# Patient Record
Sex: MALE | Race: WHITE
[De-identification: names, ages, dates, MRNs, and addresses within clinical notes are randomized per-mention and may not be internally consistent; named-entity substitution may affect disease eponyms.]

---

## 2018-05-21 ENCOUNTER — HOSPITAL ENCOUNTER (OUTPATIENT)
Dept: HOSPITAL 17 - NEPE | Age: 83
Setting detail: OBSERVATION
LOS: 1 days | Discharge: HOME | End: 2018-05-22
Attending: HOSPITALIST | Admitting: HOSPITALIST
Payer: OTHER GOVERNMENT

## 2018-05-21 ENCOUNTER — HOSPITAL ENCOUNTER (EMERGENCY)
Dept: HOSPITAL 17 - PHED | Age: 83
Discharge: LEFT BEFORE DISCHARGE | End: 2018-05-21
Payer: OTHER GOVERNMENT

## 2018-05-21 VITALS
RESPIRATION RATE: 20 BRPM | DIASTOLIC BLOOD PRESSURE: 64 MMHG | SYSTOLIC BLOOD PRESSURE: 123 MMHG | HEART RATE: 75 BPM | OXYGEN SATURATION: 97 % | TEMPERATURE: 98.2 F

## 2018-05-21 VITALS
SYSTOLIC BLOOD PRESSURE: 127 MMHG | TEMPERATURE: 98.7 F | RESPIRATION RATE: 16 BRPM | HEART RATE: 84 BPM | DIASTOLIC BLOOD PRESSURE: 72 MMHG | OXYGEN SATURATION: 96 %

## 2018-05-21 VITALS
OXYGEN SATURATION: 96 % | HEART RATE: 70 BPM | SYSTOLIC BLOOD PRESSURE: 108 MMHG | RESPIRATION RATE: 16 BRPM | TEMPERATURE: 97.4 F | DIASTOLIC BLOOD PRESSURE: 63 MMHG

## 2018-05-21 VITALS
DIASTOLIC BLOOD PRESSURE: 78 MMHG | OXYGEN SATURATION: 99 % | RESPIRATION RATE: 16 BRPM | HEART RATE: 89 BPM | TEMPERATURE: 97.7 F | SYSTOLIC BLOOD PRESSURE: 115 MMHG

## 2018-05-21 VITALS
RESPIRATION RATE: 18 BRPM | DIASTOLIC BLOOD PRESSURE: 77 MMHG | TEMPERATURE: 98.7 F | OXYGEN SATURATION: 98 % | HEART RATE: 77 BPM | SYSTOLIC BLOOD PRESSURE: 128 MMHG

## 2018-05-21 VITALS — BODY MASS INDEX: 24.24 KG/M2 | WEIGHT: 145.51 LBS | HEIGHT: 65 IN

## 2018-05-21 VITALS — DIASTOLIC BLOOD PRESSURE: 57 MMHG | SYSTOLIC BLOOD PRESSURE: 118 MMHG | RESPIRATION RATE: 16 BRPM

## 2018-05-21 VITALS — BODY MASS INDEX: 25.31 KG/M2 | HEIGHT: 65 IN | WEIGHT: 151.9 LBS

## 2018-05-21 VITALS — RESPIRATION RATE: 17 BRPM | OXYGEN SATURATION: 98 %

## 2018-05-21 VITALS — SYSTOLIC BLOOD PRESSURE: 116 MMHG | TEMPERATURE: 97.8 F | DIASTOLIC BLOOD PRESSURE: 78 MMHG

## 2018-05-21 DIAGNOSIS — I48.91: ICD-10-CM

## 2018-05-21 DIAGNOSIS — Z87.891: ICD-10-CM

## 2018-05-21 DIAGNOSIS — M88.9: ICD-10-CM

## 2018-05-21 DIAGNOSIS — E78.5: ICD-10-CM

## 2018-05-21 DIAGNOSIS — I44.30: ICD-10-CM

## 2018-05-21 DIAGNOSIS — Z95.0: ICD-10-CM

## 2018-05-21 DIAGNOSIS — I10: ICD-10-CM

## 2018-05-21 DIAGNOSIS — S09.90XA: Primary | ICD-10-CM

## 2018-05-21 DIAGNOSIS — R61: ICD-10-CM

## 2018-05-21 DIAGNOSIS — Z95.3: ICD-10-CM

## 2018-05-21 DIAGNOSIS — R55: ICD-10-CM

## 2018-05-21 DIAGNOSIS — E03.9: ICD-10-CM
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
## 2018-05-21 DIAGNOSIS — Z79.01: ICD-10-CM

## 2018-05-21 DIAGNOSIS — Z53.21: Primary | ICD-10-CM
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
## 2018-05-21 DIAGNOSIS — I42.9: ICD-10-CM

## 2018-05-21 DIAGNOSIS — G47.30: ICD-10-CM

## 2018-05-21 LAB
ALBUMIN SERPL-MCNC: 3.3 GM/DL (ref 3.4–5)
ALP SERPL-CCNC: 87 U/L (ref 45–117)
ALT SERPL-CCNC: 24 U/L (ref 12–78)
AST SERPL-CCNC: 21 U/L (ref 15–37)
BASOPHILS # BLD AUTO: 0 TH/MM3 (ref 0–0.2)
BASOPHILS NFR BLD: 0.4 % (ref 0–2)
BILIRUB SERPL-MCNC: 0.8 MG/DL (ref 0.2–1)
BUN SERPL-MCNC: 19 MG/DL (ref 7–18)
CALCIUM SERPL-MCNC: 8.7 MG/DL (ref 8.5–10.1)
CHLORIDE SERPL-SCNC: 104 MEQ/L (ref 98–107)
CREAT SERPL-MCNC: 0.8 MG/DL (ref 0.6–1.3)
EOSINOPHIL # BLD: 0.1 TH/MM3 (ref 0–0.4)
EOSINOPHIL NFR BLD: 2.1 % (ref 0–4)
ERYTHROCYTE [DISTWIDTH] IN BLOOD BY AUTOMATED COUNT: 14.6 % (ref 11.6–17.2)
GFR SERPLBLD BASED ON 1.73 SQ M-ARVRAT: 92 ML/MIN (ref 89–?)
GLUCOSE SERPL-MCNC: 87 MG/DL (ref 74–106)
HCO3 BLD-SCNC: 26.9 MEQ/L (ref 21–32)
HCT VFR BLD CALC: 39 % (ref 39–51)
HGB BLD-MCNC: 13.1 GM/DL (ref 13–17)
INR PPP: 2.1 RATIO
LYMPHOCYTES # BLD AUTO: 0.7 TH/MM3 (ref 1–4.8)
LYMPHOCYTES NFR BLD AUTO: 12.5 % (ref 9–44)
MAGNESIUM SERPL-MCNC: 2.1 MG/DL (ref 1.5–2.5)
MCH RBC QN AUTO: 32.3 PG (ref 27–34)
MCHC RBC AUTO-ENTMCNC: 33.5 % (ref 32–36)
MCV RBC AUTO: 96.5 FL (ref 80–100)
MONOCYTE #: 0.6 TH/MM3 (ref 0–0.9)
MONOCYTES NFR BLD: 11 % (ref 0–8)
NEUTROPHILS # BLD AUTO: 4.3 TH/MM3 (ref 1.8–7.7)
NEUTROPHILS NFR BLD AUTO: 74 % (ref 16–70)
PLATELET # BLD: 163 TH/MM3 (ref 150–450)
PMV BLD AUTO: 8.4 FL (ref 7–11)
PROT SERPL-MCNC: 6.9 GM/DL (ref 6.4–8.2)
PROTHROMBIN TIME: 21.6 SEC (ref 9.8–11.6)
RBC # BLD AUTO: 4.05 MIL/MM3 (ref 4.5–5.9)
SODIUM SERPL-SCNC: 140 MEQ/L (ref 136–145)
TROPONIN I SERPL-MCNC: (no result) NG/ML (ref 0.02–0.05)
TROPONIN I SERPL-MCNC: (no result) NG/ML (ref 0.02–0.05)
WBC # BLD AUTO: 5.8 TH/MM3 (ref 4–11)

## 2018-05-21 PROCEDURE — 97162 PT EVAL MOD COMPLEX 30 MIN: CPT

## 2018-05-21 PROCEDURE — 97166 OT EVAL MOD COMPLEX 45 MIN: CPT

## 2018-05-21 PROCEDURE — 82550 ASSAY OF CK (CPK): CPT

## 2018-05-21 PROCEDURE — 85730 THROMBOPLASTIN TIME PARTIAL: CPT

## 2018-05-21 PROCEDURE — 85610 PROTHROMBIN TIME: CPT

## 2018-05-21 PROCEDURE — 84484 ASSAY OF TROPONIN QUANT: CPT

## 2018-05-21 PROCEDURE — 99285 EMERGENCY DEPT VISIT HI MDM: CPT

## 2018-05-21 PROCEDURE — 73080 X-RAY EXAM OF ELBOW: CPT

## 2018-05-21 PROCEDURE — 83690 ASSAY OF LIPASE: CPT

## 2018-05-21 PROCEDURE — 84443 ASSAY THYROID STIM HORMONE: CPT

## 2018-05-21 PROCEDURE — 93880 EXTRACRANIAL BILAT STUDY: CPT

## 2018-05-21 PROCEDURE — 71045 X-RAY EXAM CHEST 1 VIEW: CPT

## 2018-05-21 PROCEDURE — 85025 COMPLETE CBC W/AUTO DIFF WBC: CPT

## 2018-05-21 PROCEDURE — G0378 HOSPITAL OBSERVATION PER HR: HCPCS

## 2018-05-21 PROCEDURE — 93005 ELECTROCARDIOGRAM TRACING: CPT

## 2018-05-21 PROCEDURE — 99281 EMR DPT VST MAYX REQ PHY/QHP: CPT

## 2018-05-21 PROCEDURE — 70450 CT HEAD/BRAIN W/O DYE: CPT

## 2018-05-21 PROCEDURE — 80053 COMPREHEN METABOLIC PANEL: CPT

## 2018-05-21 PROCEDURE — 83735 ASSAY OF MAGNESIUM: CPT

## 2018-05-21 NOTE — PD
Data


Data


Last Documented VS





Vital Signs








  Date Time  Temp Pulse Resp B/P (MAP) Pulse Ox O2 Delivery O2 Flow Rate FiO2


 


5/21/18 15:08  78 16 118/57 (77)    





  77 16 125/62 (83)    





  71 16 118/64 (82)    


 


5/21/18 15:07     98 Room Air  


 


5/21/18 13:50 98.2       








Orders





 Orders


Electrocardiogram (5/21/18 )


Blood Glucose (5/21/18 13:58)


Ct Brain W/O Iv Contrast(Rout) (5/21/18 )


Complete Blood Count With Diff (5/21/18 14:31)


Comprehensive Metabolic Panel (5/21/18 14:31)


Ckmb (Isoenzyme) Profile (5/21/18 14:31)


Troponin I (5/21/18 14:31)


Prothrombin Time / Inr (Pt) (5/21/18 14:31)


Act Partial Throm Time (Ptt) (5/21/18 14:31)


Lipase (5/21/18 14:31)


Magnesium (Mg) (5/21/18 14:31)


Thyroid Stimulating Hormone (5/21/18 14:31)


Chest, Single Ap (5/21/18 14:31)


Iv Access Insert/Monitor (5/21/18 14:31)


Ecg Monitoring (5/21/18 14:31)


Oximetry (5/21/18 14:31)


Orthostatic Vital Signs (5/21/18 14:31)


Elbow, Complete (4 Vws) (5/21/18 )


Admit Order (Ed Use Only) (5/21/18 16:57)





Labs





Laboratory Tests








Test


  5/21/18


14:45


 


White Blood Count 5.8 TH/MM3 


 


Red Blood Count 4.05 MIL/MM3 


 


Hemoglobin 13.1 GM/DL 


 


Hematocrit 39.0 % 


 


Mean Corpuscular Volume 96.5 FL 


 


Mean Corpuscular Hemoglobin 32.3 PG 


 


Mean Corpuscular Hemoglobin


Concent 33.5 % 


 


 


Red Cell Distribution Width 14.6 % 


 


Platelet Count 163 TH/MM3 


 


Mean Platelet Volume 8.4 FL 


 


Neutrophils (%) (Auto) 74.0 % 


 


Lymphocytes (%) (Auto) 12.5 % 


 


Monocytes (%) (Auto) 11.0 % 


 


Eosinophils (%) (Auto) 2.1 % 


 


Basophils (%) (Auto) 0.4 % 


 


Neutrophils # (Auto) 4.3 TH/MM3 


 


Lymphocytes # (Auto) 0.7 TH/MM3 


 


Monocytes # (Auto) 0.6 TH/MM3 


 


Eosinophils # (Auto) 0.1 TH/MM3 


 


Basophils # (Auto) 0.0 TH/MM3 


 


CBC Comment DIFF FINAL 


 


Differential Comment  


 


Prothrombin Time 21.6 SEC 


 


Prothromb Time International


Ratio 2.1 RATIO 


 


 


Activated Partial


Thromboplast Time 35.7 SEC 


 


 


Blood Urea Nitrogen 19 MG/DL 


 


Creatinine 0.80 MG/DL 


 


Random Glucose 87 MG/DL 


 


Total Protein 6.9 GM/DL 


 


Albumin 3.3 GM/DL 


 


Calcium Level 8.7 MG/DL 


 


Magnesium Level 2.1 MG/DL 


 


Alkaline Phosphatase 87 U/L 


 


Aspartate Amino Transf


(AST/SGOT) 21 U/L 


 


 


Alanine Aminotransferase


(ALT/SGPT) 24 U/L 


 


 


Total Bilirubin 0.8 MG/DL 


 


Sodium Level 140 MEQ/L 


 


Potassium Level 4.3 MEQ/L 


 


Chloride Level 104 MEQ/L 


 


Carbon Dioxide Level 26.9 MEQ/L 


 


Anion Gap 9 MEQ/L 


 


Estimat Glomerular Filtration


Rate 92 ML/MIN 


 


 


Total Creatine Kinase 80 U/L 


 


Troponin I


  LESS THAN 0.02


NG/ML


 


Lipase 65 U/L 


 


Thyroid Stimulating Hormone


3rd Gen 1.840 uIU/ML 


 











MDM


Supervised Visit with NORMAN:  Yes


Narrative Course


The history, exam, and medical decision-making in the associated midlevel 

provider note were completed with my assistance. I reviewed and agree with the 

findings presented.  I attest that I had a face-to-face encounter with the 

patient on the same day, and personally performed and documented my assessment 

and findings in the medical record. 





*My assessment and Findings: This is an 85-year-old male who presents to the 

emergency department having had an episode of syncope.  He was placed in a 

monitor and an IV was established.  He does have a history of heart disease.  

His pacemaker was interrogated which is reassuring.  Initial labs are 

reassuring.  Patient will be placed in observation for serial cardiac enzymes 

and syncope evaluation given his age and comorbidities.


Diagnosis





 Primary Impression:  


 Syncope


 Qualified Codes:  R55 - Syncope and collapse











Lucero Rebolledo MD May 21, 2018 19:28

## 2018-05-21 NOTE — RADRPT
EXAM DATE/TIME:  05/21/2018 14:42 

 

[facility] COMPARISON:     

CHEST SINGLE AP, December 11, 2015, 8:52.

 

                     

INDICATIONS :     

Syncope.

                     

 

MEDICAL HISTORY :     

None.          

 

SURGICAL HISTORY :     

Pacemaker. CABG.  

 

ENCOUNTER:     

Initial                                        

 

ACUITY:     

1 day      

 

PAIN SCORE:     

0/10

 

LOCATION:     

Bilateral chest 

 

FINDINGS:     

Pacemaker device is noted with control pack over the left chest. A mild infiltrate in the left lung b
ase with slight blunting of the costophrenic angle. Right lung is grossly clear. Cardiac contours are
 satisfactory contact for technique and projection.

 

CONCLUSION:     

Mild left base infiltrate and effusion

 

 

 

 Sanchez Wu MD on May 21, 2018 at 15:02           

Board Certified Radiologist.

 This report was verified electronically.

## 2018-05-21 NOTE — RADRPT
EXAM DATE/TIME:  05/21/2018 14:47 

 

[facility] COMPARISON:     

No previous studies available for comparison.

 

 

INDICATIONS :     

Fall,hit back of head on table.

                      

 

RADIATION DOSE:     

66.34 CTDIvol (mGy) 

 

 

 

MEDICAL HISTORY :     

Cardiovascular disease. Hypertension. 

 

SURGICAL HISTORY :      

Pacemaker. 

 

ENCOUNTER:      

Initial

 

ACUITY:      

1 day

 

PAIN SCALE:      

0/10

 

LOCATION:        

cranial 

 

TECHNIQUE:     

Multiple contiguous axial images were obtained of the head.  Using automated exposure control and adj
ustment of the mA and/or kV according to patient size, radiation dose was kept as low as reasonably a
chievable to obtain optimal diagnostic quality images.   DICOM format image data is available electro
nically for review and comparison.  

 

FINDINGS:     

 

CEREBRUM:     

Mild, symmetric cortical and central atrophy.  Periventricular areas of diminished attenuation are ch
aracteristic of mild small vessel ischemic demyelination. No evidence of midline shift, mass lesion, 
hemorrhage or acute infarction.  No extra-axial fluid collections are seen.

 

POSTERIOR FOSSA:     

The cerebellum and brainstem are intact.  The 4th ventricle is midline.  The cerebellopontine angle i
s unremarkable.

 

EXTRACRANIAL:     

The visualized portion of the orbits is intact.

 

SKULL:     

The calvaria is intact.  No evidence of skull fracture.

 

CONCLUSION:     

1. Some chronic changes with minimal periventricular small vessel ischemic demyelination and mild cor
tical and central atrophy.

2. Nothing acute.

 

 

 

 Karan Dunn MD on May 21, 2018 at 15:52           

Board Certified Radiologist.

 This report was verified electronically.

## 2018-05-21 NOTE — RADRPT
EXAM DATE/TIME:  05/21/2018 15:39 

 

[facility] COMPARISON:     

No previous studies available for comparison.

 

                     

INDICATIONS :     

Pt fell last night. 

                     

 

MEDICAL HISTORY :     

None.          

 

SURGICAL HISTORY :     

Pacemaker. CABG.  

 

ENCOUNTER:     

Subsequent                                        

 

ACUITY:     

1 day      

 

PAIN SCORE:     

0/10

 

LOCATION:     

Right upper extremity Elbow. 

 

FINDINGS:     

Multiple view examination of the right elbow demonstrates increased density and coarse trabecular thi
ckening in the distal humerus. The elbow joint otherwise appears intact without evidence of fracture,
 dislocation or significant arthropathy.

 

 

CONCLUSION:     

1. No evidence of acute fracture or dislocation.

2. Increased density and trabecular thickening in the distal humerus which merit present Paget's dise
ase.

 

 

 

 

 Chun Penn MD on May 21, 2018 at 16:15           

Board Certified Radiologist.

 This report was verified electronically.

## 2018-05-21 NOTE — PD
HPI


Chief Complaint:  Syncope/Near-Syncope


Time Seen by Provider:  14:19


Travel History


International Travel<30 days:  No


Contact w/Intl Traveler<30days:  No


Traveled to known affect area:  No





History of Present Illness


HPI


85-year-old male that presents to the ED for evaluation of syncope and head 

injury.  Per patient his happened yesterday.  Per patient she had "fainting 

episode "yesterday.  Per patient he has never had this before.  Per patient he 

was walking when he all of a sudden felt like he was going to pass out and he 

was not able to hold something and landed on his right ribs and right elbow on 

the table.  Patient does take Coumadin.  He denies hitting his head or losing 

consciousness.  Denies any chest pain or shortness of breath.  Per patient he 

does have a couple of abrasions to his right ribs as well as to his right 

elbow.  He is able to move them fully.  Denies any neck or back pain.  He has a 

pacemaker and his provider from [place] wanted him to come here to get 

evaluated.  Denies any bowel movement or urinary issues.  No numbness, tingling

, weakness.  Patient denies anything preluding to the syncope.  Denies any 

other symptoms.  She denies feeling her pacemaker going off.





PFSH


Past Medical History


Hx Anticoagulant Therapy:  Yes


Heart Rhythm Problems:  Yes (afib )


Cancer:  No


Cardiac Catheterization:  Yes


Cardiovascular Problems:  Yes (DEFB/PACER)


Endocrine:  No


Genitourinary:  No


Hypertension:  Yes


Musculoskeletal:  No


Neurologic:  No


Psychiatric:  No


Reproductive:  No


Respiratory:  No


Sleep Apnea:  Yes (uses CPAP )





Past Surgical History


Pacemaker:  Yes


Valve Replacement:  Yes (aortic valve replacement )





Social History


Alcohol Use:  No


Tobacco Use:  No


Substance Use:  No





Allergies-Medications


(Allergen,Severity, Reaction):  


Coded Allergies:  


     No Known Allergies (Verified  Adverse Reaction, Unknown, 5/21/18)


Reported Meds & Prescriptions





Reported Meds & Active Scripts


Active


Reported


Folic Acid 0.4 Mg Tab 400 Mcg PO DAILY


Metoprolol Succinate ER 24 HR (Metoprolol Succinate) 25 Mg Tab 12.5 Mg PO DAILY


Zocor (Simvastatin) 20 Mg Tab 20 Mg PO HS


Thera-D 2000 (Cholecalciferol) 2,000 Unit Tab 2,000 Units PO DAILY


Warfarin 2.5 Mg Tab 2.5 Mg PO EVERY OTHER DAY


Warfarin 5 Mg Tab 5 Mg PO EVERY OTHER DAY








Review of Systems


Except as stated in HPI:  all other systems reviewed are Neg





Physical Exam


Narrative


GENERAL: 


SKIN: Warm and dry.  Patient has abrasions to the right rib cage as well as to 

the right elbow.


HEAD: Atraumatic. Normocephalic. 


EYES: Pupils equal and round. No scleral icterus. No injection or drainage. 


ENT: No nasal bleeding or discharge.  Mucous membranes pink and moist.  Tongue 

is midline.  No uvula deviation.


NECK: Trachea midline. No JVD. 


CARDIOVASCULAR: Regular rate and rhythm.  No murmurs, S3, S4.


RESPIRATORY: No accessory muscle use. Clear to auscultation. Breath sounds 

equal bilaterally. 


GASTROINTESTINAL: Abdomen soft, non-tender, nondistended. Hepatic and splenic 

margins not palpable. 


MUSCULOSKELETAL: Extremities without clubbing, cyanosis, or edema. No obvious 

deformities.  Full range of motion of the upper and lower extremities 

bilaterally.  2+ pulses bilaterally.


NEUROLOGICAL: Awake and alert. No obvious cranial nerve deficits.  Motor 

grossly within normal limits. Five out of 5 muscle strength in the arms and 

legs.  Normal speech.


PSYCHIATRIC: Appropriate mood and affect; insight and judgment normal.





Data


Data


Last Documented VS





Vital Signs








  Date Time  Temp Pulse Resp B/P (MAP) Pulse Ox O2 Delivery O2 Flow Rate FiO2


 


5/21/18 15:08  78 16 118/57 (77)    





  77 16 125/62 (83)    





  71 16 118/64 (82)    


 


5/21/18 15:07     98 Room Air  


 


5/21/18 13:50 98.2       








Orders





 Orders


Electrocardiogram (5/21/18 )


Blood Glucose (5/21/18 13:58)


Ct Brain W/O Iv Contrast(Rout) (5/21/18 )


Electrocardiogram (5/21/18 14:31)


Complete Blood Count With Diff (5/21/18 14:31)


Comprehensive Metabolic Panel (5/21/18 14:31)


Ckmb (Isoenzyme) Profile (5/21/18 14:31)


Troponin I (5/21/18 14:31)


Prothrombin Time / Inr (Pt) (5/21/18 14:31)


Act Partial Throm Time (Ptt) (5/21/18 14:31)


Lipase (5/21/18 14:31)


Magnesium (Mg) (5/21/18 14:31)


Thyroid Stimulating Hormone (5/21/18 14:31)


Chest, Single Ap (5/21/18 14:31)


Iv Access Insert/Monitor (5/21/18 14:31)


Ecg Monitoring (5/21/18 14:31)


Oximetry (5/21/18 14:31)


Orthostatic Vital Signs (5/21/18 14:31)


Elbow, Complete (4 Vws) (5/21/18 )


Admit Order (Ed Use Only) (5/21/18 16:57)





Labs





Laboratory Tests








Test


  5/21/18


14:45


 


White Blood Count 5.8 TH/MM3 


 


Red Blood Count 4.05 MIL/MM3 


 


Hemoglobin 13.1 GM/DL 


 


Hematocrit 39.0 % 


 


Mean Corpuscular Volume 96.5 FL 


 


Mean Corpuscular Hemoglobin 32.3 PG 


 


Mean Corpuscular Hemoglobin


Concent 33.5 % 


 


 


Red Cell Distribution Width 14.6 % 


 


Platelet Count 163 TH/MM3 


 


Mean Platelet Volume 8.4 FL 


 


Neutrophils (%) (Auto) 74.0 % 


 


Lymphocytes (%) (Auto) 12.5 % 


 


Monocytes (%) (Auto) 11.0 % 


 


Eosinophils (%) (Auto) 2.1 % 


 


Basophils (%) (Auto) 0.4 % 


 


Neutrophils # (Auto) 4.3 TH/MM3 


 


Lymphocytes # (Auto) 0.7 TH/MM3 


 


Monocytes # (Auto) 0.6 TH/MM3 


 


Eosinophils # (Auto) 0.1 TH/MM3 


 


Basophils # (Auto) 0.0 TH/MM3 


 


CBC Comment DIFF FINAL 


 


Differential Comment  


 


Prothrombin Time 21.6 SEC 


 


Prothromb Time International


Ratio 2.1 RATIO 


 


 


Activated Partial


Thromboplast Time 35.7 SEC 


 


 


Blood Urea Nitrogen 19 MG/DL 


 


Creatinine 0.80 MG/DL 


 


Random Glucose 87 MG/DL 


 


Total Protein 6.9 GM/DL 


 


Albumin 3.3 GM/DL 


 


Calcium Level 8.7 MG/DL 


 


Magnesium Level 2.1 MG/DL 


 


Alkaline Phosphatase 87 U/L 


 


Aspartate Amino Transf


(AST/SGOT) 21 U/L 


 


 


Alanine Aminotransferase


(ALT/SGPT) 24 U/L 


 


 


Total Bilirubin 0.8 MG/DL 


 


Sodium Level 140 MEQ/L 


 


Potassium Level 4.3 MEQ/L 


 


Chloride Level 104 MEQ/L 


 


Carbon Dioxide Level 26.9 MEQ/L 


 


Anion Gap 9 MEQ/L 


 


Estimat Glomerular Filtration


Rate 92 ML/MIN 


 


 


Total Creatine Kinase 80 U/L 


 


Troponin I


  LESS THAN 0.02


NG/ML


 


Lipase 65 U/L 


 


Thyroid Stimulating Hormone


3rd Gen 1.840 uIU/ML 


 











MDM


Medical Decision Making


Medical Screen Exam Complete:  Yes


Emergency Medical Condition:  Yes


Medical Record Reviewed:  Yes


Interpretation(s)


CBC & BMP Diagram


5/21/18 14:45








Total Protein 6.9, Albumin 3.3 L, Calcium Level 8.7, Magnesium Level 2.1, 

Alkaline Phosphatase 87, Aspartate Amino Transf (AST/SGOT) 21, Alanine 

Aminotransferase (ALT/SGPT) 24, Total Bilirubin 0.8








Last Impressions








Chest X-Ray 5/21/18 1431 Signed





Impressions: 





 Service Date/Time:  Monday, May 21, 2018 14:42 - CONCLUSION:  Mild left base 





 infiltrate and effusion     Sanchez Wu MD 


 


Head CT 5/21/18 0000 Signed





Impressions: 





 Service Date/Time:  Monday, May 21, 2018 14:47 - CONCLUSION:  1. Some chronic 





 changes with minimal periventricular small vessel ischemic demyelination and 





 mild cortical and central atrophy. 2. Nothing acute.     Karan Dunn MD 


 


Elbow X-Ray 5/21/18 0000 Signed





Impressions: 





 Service Date/Time:  Monday, May 21, 2018 15:39 - CONCLUSION:  1. No evidence 

of 





 acute fracture or dislocation. 2. Increased density and trabecular thickening 

in 





 the distal humerus which merit present Paget's disease.      Chun Penn MD 





EKG show paced rhythm.  PVCs noted.  Read by me and attending.  No sign of ST 

elevation or ischemia.


Troponin and CK-MB negative.


Coags with elevated INR


Differential Diagnosis


Syncope versus presyncope versus cardiac syncope versus arrhythmia versus 

versus fracture versus bruise versus contusion


Narrative Course


85-year-old male that presents to the ED for evaluation of syncope.  Patient 

was properly examined and was found to have signs and symptoms consistent with 

appears to be syncope.  Labs and imaging order.  Labs and imaging showed no 

sign of acute disease.  Patient was reassured.  Unclear etiology of the syncope 

with deafly concerning.  He does have multiple comorbidities for different 

disease.  The recommend admission for further evaluation.  Patient agrees for 

this.  My attending Dr. Rebolledo evaluated the patient agrees with this.  Patient 

was admitted to Dr. Crowder who agrees to admission.





Diagnosis





 Primary Impression:  


 Syncope


 Qualified Codes:  R55 - Syncope and collapse





Admitting Information


Admitting Physician Requests:  Observation











Jas Bonilla May 21, 2018 15:36

## 2018-05-21 NOTE — HHI.HP
__________________________________________________





[facility]


Service


[facility]


Primary Care Physician


[facility]


Admission Diagnosis





acute syncope


Diagnoses:  


(1) Pre-syncope


Diagnosis:  Principal





(2) Dizziness


Diagnosis:  Principal





(3) Fall


Diagnosis:  Principal





(4) A-fib


Chief Complaint:  


Dizzy and fell


Travel History


International Travel<30 Days:  No


Contact w/Intl Traveler <30 Da:  No


Traveled to Known Affected Are:  No


History of Present Illness


85-year-old male with past medical history significant for aortic valve 

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 

cardiomyopathy, HTN, HDL, hypothyroidism, atrial fibrillation anticoagulated on 

Coumadin, temporal arteritis, and sleep apnea who presents to the emergency 

department due to near syncopal episode which occurred yesterday.  Patient is 

seen and examined in the emergency department with significant other at 

bedside.  Patient reports that yesterday around 7 PM he was at home and was 

walking when he felt dizzy and felt like he was "going down".  Patient did try 

to hold on to a chair that was nearby however unable to do so and fell on his 

right side hitting his right rib cage as well as right elbow. Right rib pain 

and elbow pain is better today, with full ROM or right arm.  Patient denies any 

loss of consciousness or head trauma.  He reports that shortly after episode he 

sat up and became nauseous but no vomiting, nausea resolved several seconds 

afterwards.  Patient is a significant other also reports that patient did 

become diaphoretic following event.  Patient denies any chest pain, shortness 

of breath, or heart palpitations prior, during, or shortly after episode.  

Patient also denies any noted weakness, numbness, tingling, changes in speech, 

or involuntary movements prior, during, or after event.  He denies any recent 

illness, cough, shortness of breath, fevers, chills, nausea, vomiting, diarrhea

, or headache. Significant other who is at bedside reports that about 10 

minutes following episode she did check his blood pressure in his systolic 

pressure was in the 130s.  He follows up with a cardiologist in [place] and 

contacted him to report episode which occurred yesterday.  He was instructed to 

come to the emergency department for further evaluation.  He denies any recent 

changes in his medications and reports that he checks his blood pressure on 

daily basis and his systolic is usually in the 110s-120s.





Review of Systems


Except as stated in HPI:  all other systems reviewed are Neg





Past Family Social History


Past Medical History


AVR


Cardiomyopathy


High degree AV block with dual-chamber pacemaker placement


soy richardson on (Coumadin)


Temporal arteritis 5 year ago


HLD


HTN


Hypothyroidism


Sleep apnea with CPAP at HS


Past Surgical History


Pacemaker placement 


Aortic valve replacement (pig valve )


Reported Medications





Reported Meds & Active Scripts


Active


Reported


Folic Acid 0.4 Mg Tab 400 Mcg PO DAILY


Metoprolol Succinate ER 24 HR (Metoprolol Succinate) 25 Mg Tab 12.5 Mg PO DAILY


Zocor (Simvastatin) 20 Mg Tab 20 Mg PO HS


Thera-D 2000 (Cholecalciferol) 2,000 Unit Tab 2,000 Units PO DAILY


Warfarin 2.5 Mg Tab 2.5 Mg PO EVERY OTHER DAY


Warfarin 5 Mg Tab 5 Mg PO EVERY OTHER DAY


Allergies:  


Coded Allergies:  


     No Known Allergies (Verified  Adverse Reaction, Unknown, 18)


Family History


Father: MI


Social History


Tobacco: quit 40 years ago


Alcohol: quit 40years ago 


Illicit drug use: denies





Physical Exam


Vital Signs





Vital Signs








  Date Time  Temp Pulse Resp B/P (MAP) Pulse Ox O2 Delivery O2 Flow Rate FiO2


 


18 17:00 97.7 89 16 115/78 (90) 99 Room Air  


 


18 15:08  78 16 118/57 (77)    





  77 16 125/62 (83)    





  71 16 118/64 (82)    


 


18 15:07   17  98 Room Air  


 


18 14:35  79 19  98   


 


18 13:50 98.2 75 20 123/64 (83) 97   








Physical Exam


GENERAL: This is a well-nourished, well-developed patient, in no apparent 

distress.


SKIN: No rashes. Cool and dry. Right elbow abrasion, right rib cage abrasion 

open to air.


HEAD: Atraumatic. Normocephalic. No temporal or scalp tenderness.


EYES: Pupils equal round and reactive. Extraocular motions intact. No scleral 

icterus. No injection or drainage. 


ENT: Nose without bleeding, purulent drainage. Throat without erythema. Uvula 

midline. Airway patent.


NECK: Trachea midline. No JVD or lymphadenopathy. Supple, nontender.


CARDIOVASCULAR: Irregular rate and rhythm without murmurs, gallops, or rubs. 


RESPIRATORY: Clear to auscultation. Breath sounds equal bilaterally. No wheezes

, rales, or rhonchi.  


GASTROINTESTINAL: Abdomen soft, non-tender, nondistended. No palpable masses. 

No guarding. +bowel sounds in all quadrants.


MUSCULOSKELETAL: Extremities without clubbing, cyanosis, or edema. No joint 

tenderness, effusion, or edema noted. No calf tenderness. 


NEUROLOGICAL: Awake and alert, oriented x3. Cranial nerves II through XII 

intact.  Motor and sensory grossly within normal limits. Five out of 5 muscle 

strength in all muscle groups.  Normal speech, no facial droop.


Laboratory





Laboratory Tests








Test


  18


14:45


 


White Blood Count 5.8 


 


Red Blood Count 4.05 


 


Hemoglobin 13.1 


 


Hematocrit 39.0 


 


Mean Corpuscular Volume 96.5 


 


Mean Corpuscular Hemoglobin 32.3 


 


Mean Corpuscular Hemoglobin


Concent 33.5 


 


 


Red Cell Distribution Width 14.6 


 


Platelet Count 163 


 


Mean Platelet Volume 8.4 


 


Neutrophils (%) (Auto) 74.0 


 


Lymphocytes (%) (Auto) 12.5 


 


Monocytes (%) (Auto) 11.0 


 


Eosinophils (%) (Auto) 2.1 


 


Basophils (%) (Auto) 0.4 


 


Neutrophils # (Auto) 4.3 


 


Lymphocytes # (Auto) 0.7 


 


Monocytes # (Auto) 0.6 


 


Eosinophils # (Auto) 0.1 


 


Basophils # (Auto) 0.0 


 


CBC Comment DIFF FINAL 


 


Differential Comment  


 


Prothrombin Time 21.6 


 


Prothromb Time International


Ratio 2.1 


 


 


Activated Partial


Thromboplast Time 35.7 


 


 


Blood Urea Nitrogen 19 


 


Creatinine 0.80 


 


Random Glucose 87 


 


Total Protein 6.9 


 


Albumin 3.3 


 


Calcium Level 8.7 


 


Magnesium Level 2.1 


 


Alkaline Phosphatase 87 


 


Aspartate Amino Transf


(AST/SGOT) 21 


 


 


Alanine Aminotransferase


(ALT/SGPT) 24 


 


 


Total Bilirubin 0.8 


 


Sodium Level 140 


 


Potassium Level 4.3 


 


Chloride Level 104 


 


Carbon Dioxide Level 26.9 


 


Anion Gap 9 


 


Estimat Glomerular Filtration


Rate 92 


 


 


Total Creatine Kinase 80 


 


Troponin I LESS THAN 0.02 


 


Lipase 65 


 


Thyroid Stimulating Hormone


3rd Gen 1.840 


 








Result Diagram:  


18 1445                                                                   

             18 1445





Imaging





Last Impressions








Chest X-Ray 18 1431 Signed





Impressions: 





 Service Date/Time:  Monday, May 21, 2018 14:42 - CONCLUSION:  Mild left base 





 infiltrate and effusion     Sanchez Wu MD 


 


Head CT 18 0000 Signed





Impressions: 





 Service Date/Time:  Monday, May 21, 2018 14:47 - CONCLUSION:  1. Some chronic 





 changes with minimal periventricular small vessel ischemic demyelination and 





 mild cortical and central atrophy. 2. Nothing acute.     Karan Dnun MD 


 


Elbow X-Ray 18 0000 Signed





Impressions: 





 Service Date/Time:  Monday, May 21, 2018 15:39 - CONCLUSION:  1. No evidence 

of 





 acute fracture or dislocation. 2. Increased density and trabecular thickening 

in 





 the distal humerus which merit present Paget's disease.      Chun Penn MD 











Caprini VTE Risk Assessment


Caprini VTE Risk Assessment:  Mod/High Risk (score >= 2)


Caprini Risk Assessment Model











 Point Value = 1          Point Value = 2  Point Value = 3  Point Value = 5


 


Age 41-60


Minor surgery


BMI > 25 kg/m2


Swollen legs


Varicose veins


Pregnancy or postpartum


History of unexplained or recurrent


   spontaneous 


Oral contraceptives or hormone


   replacement


Sepsis (< 1 month)


Serious lung disease, including


   pneumonia (< 1 month)


Abnormal pulmonary function


Acute myocardial infarction


Congestive heart failure (< 1 month)


History of inflammatory bowel disease


Medical patient at bed rest Age 61-74


Arthroscopic surgery


Major open surgery (> 45 min)


Laparoscopic surgery (> 45 min)


Malignancy


Confined to bed (> 72 hours)


Immobilizing plaster cast


Central venous access Age >= 75


History of VTE


Family history of VTE


Factor V Leiden


Prothrombin 94462M


Lupus anticoagulant


Anticardiolipin antibodies


Elevated serum homocysteine


Heparin-induced thrombocytopenia


Other congenital or acquired


   thrombophilia Stroke (< 1 month)


Elective arthroplasty


Hip, pelvis, or leg fracture


Acute spinal cord injury (< 1 month)








Prophylaxis Regimen











   Total Risk


Factor Score Risk Level Prophylaxis Regimen


 


0-1      Low Early ambulation


 


2 Moderate Order ONE of the following:


*Sequential Compression Device (SCD)


*Heparin 5000 units SQ BID


 


3-4 Higher Order ONE of the following medications:


*Heparin 5000 units SQ TID


*Enoxaparin/Lovenox 40 mg SQ daily (WT < 150 kg, CrCl > 30 mL/min)


*Enoxaparin/Lovenox 30 mg SQ daily (WT < 150 kg, CrCl > 10-29 mL/min)


*Enoxaparin/Lovenox 30 mg SQ BID (WT < 150 kg, CrCl > 30 mL/min)


AND/OR


*Sequential Compression Device (SCD)


 


5 or more Highest Order ONE of the following medications:


*Heparin 5000 units SQ TID (Preferred with Epidurals)


*Enoxaparin/Lovenox 40 mg SQ daily (WT < 150 kg, CrCl > 30 mL/min)


*Enoxaparin/Lovenox 30 mg SQ daily (WT < 150 kg, CrCl > 10-29 mL/min)


*Enoxaparin/Lovenox 30 mg SQ BID (WT < 150 kg, CrCl > 30 mL/min)


AND


*Sequential Compression Device (SCD)











Assessment and Plan


Assessment and Plan


85-year-old male with past medical history significant for aortic valve 

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 

cardiomyopathy, HTN, HDL, hypothyroidism, atrial fibrillation anticoagulated on 

Coumadin, temporal arteritis, and sleep apnea who presents to the emergency 

department due to near syncopal episode which occurred yesterday.





Presyncopal episode 


   -CBC unremarkable with the exception of mildly elevated neutrophils at 74.0, 

CMP unremarkable


   -Troponin less than 0.02, EKG showing paced rhythm


   -Pacemaker interrogated by [employer] representative (paper form on chart) no 

acute events


   -Orthostatic vital signs negative


   -CT of the head reviewed, chronic changes with minimal periventricular small 

vessel ischemic demyelination and mild cortical and central atrophy, no acute 

findings noted.


   -Chest x-ray reviewed, mild left base infiltrate and effusion.  Patient 

asymptomatic with no complaints of cough, shortness of breath, O2 saturation 

stable on room air, afebrile, mildly elevated neutrophils, repeat CBC in the 

a.m.


   -Check carotid ultrasound, check 2D echo





Fall


   -Patient anticoagulated on Coumadin due to A. fib, CT of the brain with no 

acute findings.


   -Right elbow x-ray reviewed, no acute fracture or dislocation, noted 

increased density and trabecular thickening in the distal humerus which merits 

present Paget's disease.


   -Orthostatics negative, fall precautions.





Atrial fibrillation


AV block with pacemaker


   -We will continue metoprolol succinate 12.5 mg daily with parameters


   -Anticoagulated on Coumadin, INR 2.1


   -If still here tomorrow afternoon, will continue Coumadin.  Patient takes 

2.5 mg and 5 mg alternating dose.


   -Currently has heart rate in BP are stable





Hypothyroidism


Hyperlipidemia


   -Continue patient's levothyroxine and simvastatin





DVT prophylaxis-Coumadin


Discussed Condition With


Discussed with patient, significant other at bedside, and .  Patient 

will be admitted to observation unit, agreeable with plan.











Ashley Oliva May 21, 2018 17:24

## 2018-05-21 NOTE — RADRPT
EXAM DATE/TIME:  05/21/2018 18:01 

 

[facility] COMPARISON:     

No previous studies available for comparison.

        

 

 

INDICATIONS :     

Syncope.

                     

 

MEDICAL HISTORY :     

Hypertension.     Atrial fibrillation. Sleep apnea. Measles. 

 

SURGICAL HISTORY :     

Pacemaker.     Cardiac catheterization. Aortic valve replacement. 

 

ENCOUNTER:     

Initial

 

ACUITY:     

2 days

 

PAIN SCORE:     

0/10

 

LOCATION:     

Bilateral neck 

                     

PEAK SYSTOLIC VELOCITIES (cm/sec):

 

ICA/CCA RATIO:                    

Right: 1.4     Left: 1.2

 

ICA:                          

Right: 81.1     Left: 72.8

 

CCA:                          

Right: 58.5     Left: 61.7

 

ECA:                           

Right: 95.6     Left: 116.6

 

VERTEBRAL:           

Right: 65.2 antegrade     Left: 42.9 antegrade

             

Elevated flow velocities and ICA/CCA ratios have been found to correlate with increased degrees of

vessel stenosis, calculated as percentage of diameter relative to a normal segment of distal ICA/CCA

 

FINDINGS:     

 

RIGHT CAROTID:     

Calcified atherosclerotic plaque involving the bulb and proximal ICA. The calcified nature generates 
shadowing limiting grayscale analysis somewhat. ICA waveform is normal.

 

LEFT CAROTID:     

Minimal calcified atherosclerotic plaque involving the ICA origin. No narrowing by grayscale analysis
. The waveforms are within normal limits.

 

VERTEBRAL ARTERIES:  

Antegrade flow is seen in both vertebral arteries.

 

MISCELLANEOUS:     

None.

 

CONCLUSION:     

1. Calcified atherosclerotic plaque without a hemodynamically significant stenosis involving either c
arotid artery.

2. Antegrade flow involving both vertebral arteries.

 

 

 

 Johnathan Daniel Jr., MD on May 21, 2018 at 18:29           

Board Certified Radiologist.

 This report was verified electronically.

## 2018-05-22 VITALS
TEMPERATURE: 97.7 F | DIASTOLIC BLOOD PRESSURE: 65 MMHG | OXYGEN SATURATION: 96 % | SYSTOLIC BLOOD PRESSURE: 111 MMHG | RESPIRATION RATE: 18 BRPM | HEART RATE: 76 BPM

## 2018-05-22 VITALS
HEART RATE: 105 BPM | RESPIRATION RATE: 16 BRPM | OXYGEN SATURATION: 100 % | TEMPERATURE: 98.5 F | DIASTOLIC BLOOD PRESSURE: 83 MMHG | SYSTOLIC BLOOD PRESSURE: 127 MMHG

## 2018-05-22 VITALS
DIASTOLIC BLOOD PRESSURE: 57 MMHG | SYSTOLIC BLOOD PRESSURE: 102 MMHG | OXYGEN SATURATION: 95 % | RESPIRATION RATE: 16 BRPM | HEART RATE: 83 BPM

## 2018-05-22 VITALS
RESPIRATION RATE: 16 BRPM | SYSTOLIC BLOOD PRESSURE: 123 MMHG | TEMPERATURE: 98.2 F | DIASTOLIC BLOOD PRESSURE: 72 MMHG | OXYGEN SATURATION: 96 % | HEART RATE: 71 BPM

## 2018-05-22 VITALS
DIASTOLIC BLOOD PRESSURE: 90 MMHG | TEMPERATURE: 98.5 F | SYSTOLIC BLOOD PRESSURE: 164 MMHG | RESPIRATION RATE: 18 BRPM | HEART RATE: 86 BPM | OXYGEN SATURATION: 97 %

## 2018-05-22 LAB
BASOPHILS # BLD AUTO: 0 TH/MM3 (ref 0–0.2)
BASOPHILS NFR BLD: 0.7 % (ref 0–2)
EOSINOPHIL # BLD: 0.2 TH/MM3 (ref 0–0.4)
EOSINOPHIL NFR BLD: 3.3 % (ref 0–4)
ERYTHROCYTE [DISTWIDTH] IN BLOOD BY AUTOMATED COUNT: 14.3 % (ref 11.6–17.2)
HCT VFR BLD CALC: 39.8 % (ref 39–51)
HGB BLD-MCNC: 13.3 GM/DL (ref 13–17)
INR PPP: 2 RATIO
LYMPHOCYTES # BLD AUTO: 0.8 TH/MM3 (ref 1–4.8)
LYMPHOCYTES NFR BLD AUTO: 15.2 % (ref 9–44)
MCH RBC QN AUTO: 31.8 PG (ref 27–34)
MCHC RBC AUTO-ENTMCNC: 33.5 % (ref 32–36)
MCV RBC AUTO: 94.9 FL (ref 80–100)
MONOCYTE #: 0.6 TH/MM3 (ref 0–0.9)
MONOCYTES NFR BLD: 11.4 % (ref 0–8)
NEUTROPHILS # BLD AUTO: 3.6 TH/MM3 (ref 1.8–7.7)
NEUTROPHILS NFR BLD AUTO: 69.4 % (ref 16–70)
PLATELET # BLD: 170 TH/MM3 (ref 150–450)
PMV BLD AUTO: 7.8 FL (ref 7–11)
PROTHROMBIN TIME: 20 SEC (ref 9.8–11.6)
RBC # BLD AUTO: 4.19 MIL/MM3 (ref 4.5–5.9)
TROPONIN I SERPL-MCNC: (no result) NG/ML (ref 0.02–0.05)
WBC # BLD AUTO: 5.2 TH/MM3 (ref 4–11)

## 2018-05-22 NOTE — EKG
Date Performed: 05/21/2018       Time Performed: 14:02:01

 

PTAGE:      85 years

 

EKG:      When compared tothe prior ekg,previous ekg shows pacing, present ekg shows pacing with occa
sional PVC's which are sensed. Fusion Complexes are also present. 

 

 PREVIOUS TRACING            : 12/12/2015 06.20

 

DOCTOR:   Isabel Ingram  Interpretating Date/Time  05/22/2018 16:11:36

## 2018-05-22 NOTE — MB
cc:

Tai Burt Vincent G DO

****

 

 

DATE:

05/22/2018

 

REASON FOR CONSULTATION:

Presyncopal episode.

 

HISTORY OF PRESENT ILLNESS:

Sanchez Arriaga is a pleasant 85-year-old male who previously saw my 

partner, Dr. Felix, in the office but now sees a cardiologist up 

in [place] and presented to [facility] Emergency Room due

to a presyncopal episode.  He states that he was having a normal day 

and around 7:00 p.m. he was walking to the dining room when he started

feeling dizzy.  He felt like the dizziness came on out of nowhere.  He

felt like he was going to go down or pass out.  He denies chest pain, 

shortness of breath or palpitations during the episode.  He tried to 

get to a chair, but was unable to and ended up missing and hitting his

ribs on the right side as well as his elbow on a table.  He states 

that when he was dizzy he felt like he was trying to walk forward, but

was walking off to the side.  In seeing him, he is currently 

hemodynamically stable without chest pain, shortness of breath or 

lightheadedness.

 

PAST MEDICAL HISTORY:

1.  Cardiomyopathy.

2.  High degree AV block.

3.  Atrial fibrillation, on Coumadin.

4.  Temporal arteritis.

5.  Hyperlipidemia.

6.  Hypertension.

7.  Hypothyroidism.

8.  Sleep apnea with CPAP at night.

 

PAST SURGICAL HISTORY:

ICD placement (2015). bioprosthetic aortic valve replacement (2008).

 

ALLERGIES:

NO KNOWN DRUG ALLERGIES.

 

MEDICATIONS:

1.  Coumadin 2.5/5 mg every other day.

2.  Zocor 20 mg every night.

3.  Metoprolol succinate 12.5 mg daily.

4.  Folic acid 400 mcg daily.

 

FAMILY HISTORY:

Denies premature coronary artery disease or sudden cardiac death 

within the family.

 

SOCIAL HISTORY:

The patient quit tobacco and alcohol around 40 years ago.  Denies drug

abuse.

 

REVIEW OF SYSTEMS:

Fourteen systems were reviewed including osteopathic.  Pertinent 

positives and negatives as above, otherwise negative.

 

PHYSICAL EXAMINATION:

VITAL SIGNS:  Temperature 98.2, heart rate 71, blood pressure 123/72, 

respirations 16, pulse oximetry 96% on room air.

GENERAL:  The patient appears well in no acute distress.  Alert, awake

and oriented x3.

HEENT:  Extraocular muscles intact.  Mucous membranes moist.

NECK:  Supple.  No JVD at 45 degrees.  No carotid bruits heard 

bilaterally.  Carotid upstroke is brisk in nature.

HEART:  Regular rate and rhythm.  Positive first and second heart 

sounds with a 1/6 early peaking crescendo decrescendo murmur to the 

right sternal border.

LUNGS:  Clear to auscultation bilaterally.  No wheezes, rales or 

rhonchi.

ABDOMEN:  Soft, nontender, nondistended.  No organomegaly noted.

EXTREMITIES:  Show no clubbing, cyanosis or edema.  Femoral and distal

pulses are intact bilaterally.

NEUROLOGIC:  No focal deficits.

SKIN:  Warm, dry and intact.

OSTEOPATHIC:  No kyphoscoliosis, lordosis or paraspinal tender points.

 

LABORATORY DATA:

Hemoglobin 13.3, hematocrit 39.8, platelets 170.  INR 2.0.  Potassium 

4.3, BUN 19, creatinine 0.80.  Troponin negative x3.

 

CARDIOLOGY STUDIES:

Electrocardiogram (05/21/2008):  Sinus rhythm with occasional PVCs, 

ventricular paced on demand.

 

IMPRESSION:

1.  Near syncopal episode of unknown cause.

2.  Cardiomyopathy, status post implantable cardioverter defibrillator

placement.

3.  History of bioprosthetic aortic valve replacement.

 

RECOMMENDATIONS:

1.  Mr. Arriaga presented with a near-syncopal episode and has been 

worked up from a cardiovascular standpoint already.

2.  His implantable cardioverter defibrillator has been interrogated 

and shows no arrhythmias or dysfunction.

3.  He had carotid ultrasound bilaterally, which showed no significant

stenosis.

4.  His 2-Dimensional echo is pending at this time, but if this is 

negative he can be discharged home from a cardiovascular standpoint.

5.  Overall, I believe that his story sounds more vertiginous in 

nature, as he was walking forward but while he was trying to walk 

forward, he was walking off to the side.

6.  No further cardiovascular workup at this time other than 

2-Dimensional echo pending.  He may be discharged home from my 

standpoint for followup with his cardiologist in the near future.

 

Thank you for allowing me to see Sanchez Arriaga.  If there are any 

questions, please do not hesitate to call.

 

 

__________________________________

Tai Burt, DO

 

 

VGP/

D: 05/22/2018, 07:13 PM

T: 05/22/2018, 09:07 PM

Visit #: [id number]

Job #: [id number]

## 2018-05-22 NOTE — HHI.PR
Subjective


Remarks


Follow-up on patient with near syncopal episode.  Patient seen and examined.  

Patient denies any complaints this morning.  Denies any dizziness, 

lightheadedness or vision changes.  Denies any chest pain, palpitations, cough 

or shortness of breath.  Denies any nausea, vomiting or abdominal pain.  He 

denies any complaints of weakness or numbness or tingling.  Patient reports 

yesterday that he was up and walking around when he developed sudden onset of 

dizziness prior to him falling to the ground.  He denies any associated chest 

pain, shortness of breath or palpitations.  He does report diaphoresis and 

nausea shortly after the episode with dry heaves.  Patient took his blood 

pressure at home about 10 minutes after the episode and noted that his systolic 

BP was 130.





Objective


Vitals





Vital Signs








  Date Time  Temp Pulse Resp B/P (MAP) Pulse Ox O2 Delivery O2 Flow Rate FiO2


 


5/22/18 10:24 98.5 86 18 164/90 (114) 97   


 


5/22/18 07:32 98.2 71 16 123/72 (89) 96   


 


5/22/18 03:38  83 16 102/57 (72) 95   


 


5/21/18 23:48 97.4 70 16 108/63 (78) 96   


 


5/21/18 18:03 98.7 77 18 128/77 (94) 98   


 


5/21/18 17:47 97.8 68 16 116/78 (91) 99   


 


5/21/18 17:00 97.7 89 16 115/78 (90) 99 Room Air  


 


5/21/18 15:08  78 16 118/57 (77)    





  77 16 125/62 (83)    





  71 16 118/64 (82)    


 


5/21/18 15:07   17  98 Room Air  


 


5/21/18 14:35  79 19  98   


 


5/21/18 13:50 98.2 75 20 123/64 (83) 97   














I/O      


 


 5/21/18 5/21/18 5/21/18 5/22/18 5/22/18 5/22/18





 06:59 14:59 22:59 06:59 14:59 22:59


 


Intake Total   200 ml   


 


Balance   200 ml   


 


      


 


Intake Oral   200 ml   


 


# Voids   1   


 


# Bowel Movements   0   








Result Diagram:  


5/22/18 0549                                                                   

             5/21/18 1445





Imaging





Last Impressions








Chest X-Ray 5/21/18 1431 Signed





Impressions: 





 Service Date/Time:  Monday, May 21, 2018 14:42 - CONCLUSION:  Mild left base 





 infiltrate and effusion     Sanchez Wu MD 


 


Head CT 5/21/18 0000 Signed





Impressions: 





 Service Date/Time:  Monday, May 21, 2018 14:47 - CONCLUSION:  1. Some chronic 





 changes with minimal periventricular small vessel ischemic demyelination and 





 mild cortical and central atrophy. 2. Nothing acute.     Karan Dunn MD 


 


Elbow X-Ray 5/21/18 0000 Signed





Impressions: 





 Service Date/Time:  Monday, May 21, 2018 15:39 - CONCLUSION:  1. No evidence 

of 





 acute fracture or dislocation. 2. Increased density and trabecular thickening 

in 





 the distal humerus which merit present Paget's disease.      Chun Penn MD 


 


Carotid Artery Ultrasound 5/21/18 0000 Signed





Impressions: 





 Service Date/Time:  Monday, May 21, 2018 18:01 - CONCLUSION:  1. Calcified 





 atherosclerotic plaque without a hemodynamically significant stenosis 

involving 





 either carotid artery. 2. Antegrade flow involving both vertebral arteries.   

  





 Johnathan Daniel Jr., MD 








Objective Remarks


GENERAL: This is a well-nourished, well-developed elderly  male patient

, in no apparent distress.  Awake and alert.  Witnessed ambulating in room 

without any difficulties.


SKIN: No rashes. Cool and dry. Right elbow abrasion, right rib cage abrasion 

open to air.


HEAD: Atraumatic. Normocephalic. No temporal or scalp tenderness.  Mild head 

tremor noted.


EYES: Pupils equal round and reactive. Extraocular motions intact. No scleral 

icterus. No injection or drainage. 


ENT: Nose without bleeding, purulent drainage. Throat without erythema. Uvula 

midline. Airway patent.  MMM.


NECK: Trachea midline. No JVD or lymphadenopathy. Supple, nontender.


CARDIOVASCULAR: Irregular rate and rhythm without murmurs, gallops, or rubs. 


RESPIRATORY: Nonlabored.  Clear to auscultation. Breath sounds equal 

bilaterally. No wheezes, rales, or rhonchi.  


GASTROINTESTINAL: Abdomen soft, non-tender, nondistended. No palpable masses. 

No guarding. +bowel sounds in all quadrants.


MUSCULOSKELETAL: Extremities without clubbing, cyanosis, or edema. No joint 

tenderness, effusion, or edema noted. No calf tenderness. 


NEUROLOGICAL: Awake and alert, oriented x3. Cranial nerves II through XII 

grossly intact.  Motor and sensory grossly within normal limits.  No focal 

neurologic findings appreciated.  Normal speech, no facial droop.


PSYCHIATRIC: Appropriate mood and affect.  Normal judgment and insight.





A/P


Problem List:  


(1) Pre-syncope


ICD Code:  R55 - Syncope and collapse


(2) Dizziness


ICD Code:  R42 - Dizziness and giddiness


(3) Fall


ICD Code:  W19.XXXA - Unspecified fall, initial encounter


(4) A-fib


ICD Code:  I48.91 - Unspecified atrial fibrillation


Assessment and Plan


85-year-old male with past medical history significant for aortic valve 

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 

cardiomyopathy, HTN, HDL, hypothyroidism, atrial fibrillation anticoagulated on 

Coumadin, temporal arteritis, and sleep apnea who presents to the emergency 

department due to near syncopal episode which occurred yesterday.





Presyncopal episode 


Troponin less than 0.02 x 3, EKG showing paced rhythm


Pacemaker interrogated by [employer] representative (paper form on chart) no 

acute events


Orthostatic vital signs negative


CT of the head reviewed, chronic changes with minimal periventricular small 

vessel ischemic demyelination and mild cortical and central atrophy, no acute 

findings noted.


Chest x-ray reviewed, mild left base infiltrate and effusion.  Patient 

asymptomatic with no complaints of cough, shortness of breath, O2 saturation 

stable on room air, afebrile, mildly elevated neutrophils


Carotid ultrasound shows no hemodynamically significant stenosis


   -check 2D echo/pending


   -Consult cardiology, appreciate recommendations - patient cleared from 

cardiology standpoint pending 2D echocardiogram results


   -continue to monitor on telemetry





s/p Fall


   -Patient anticoagulated on Coumadin due to A. fib, CT of the brain with no 

acute findings.


   -Right elbow x-ray reviewed, no acute fracture or dislocation


   -evaluated by PT and no needs identified at discharge


   -Orthostatics negative, fall precautions.





Atrial fibrillation, rate controlled


AV block with pacemaker


   -continue metoprolol succinate 12.5 mg daily with parameters


   -Anticoagulated on Coumadin, continue, INR 2.0, pharmacy to dose





Hypothyroidism, TSH 1.840


Hyperlipidemia


   -Continue patient's levothyroxine and simvastatin





DVT prophylaxis-Coumadin








Discharge patient to home once 2D echocardiogram results


Condition on discharge: Improved


Heart healthy Diet as tolerated


Ad Magalie activity


Rx written:


Follow-up with primary care physician


Discharge Planning


Possible discharge later today pending cardiology clearance and echo results











Mariia Del Rio May 22, 2018 11:57